# Patient Record
Sex: MALE | Race: WHITE
[De-identification: names, ages, dates, MRNs, and addresses within clinical notes are randomized per-mention and may not be internally consistent; named-entity substitution may affect disease eponyms.]

---

## 2018-06-11 ENCOUNTER — HOSPITAL ENCOUNTER (EMERGENCY)
Dept: HOSPITAL 25 - UCEAST | Age: 58
Discharge: HOME | End: 2018-06-11
Payer: COMMERCIAL

## 2018-06-11 VITALS — DIASTOLIC BLOOD PRESSURE: 75 MMHG | SYSTOLIC BLOOD PRESSURE: 111 MMHG

## 2018-06-11 DIAGNOSIS — H20.9: ICD-10-CM

## 2018-06-11 DIAGNOSIS — K57.92: ICD-10-CM

## 2018-06-11 DIAGNOSIS — Z87.891: ICD-10-CM

## 2018-06-11 DIAGNOSIS — I10: ICD-10-CM

## 2018-06-11 DIAGNOSIS — H93.12: Primary | ICD-10-CM

## 2018-06-11 DIAGNOSIS — H91.92: ICD-10-CM

## 2018-06-11 DIAGNOSIS — E78.5: ICD-10-CM

## 2018-06-11 PROCEDURE — 81003 URINALYSIS AUTO W/O SCOPE: CPT

## 2018-06-11 PROCEDURE — G0463 HOSPITAL OUTPT CLINIC VISIT: HCPCS

## 2018-06-11 PROCEDURE — 99212 OFFICE O/P EST SF 10 MIN: CPT

## 2018-06-11 NOTE — UC
Ear Complaint HPI





- HPI Summary


HPI Summary: 


hearing loss in left ear---began with mild tinnitus on Friday that got worse 

now has hearing loss and increase tinnitus in left ear. 2 weeks ago finishes a 

course of prednisone for posterior uviitis. has had an autoimmune work up and 

no cause was found---after completing prednisone patient reports a flare of 

diverticulitis and one week ago finished a course of cipro and flagyl.    

patient does not work in a noisy environment, .  Patient c/o some 

lightheadedness---but does not describe vertigo---








- History of Current Complaint


Hx Obtained From: Patient


Onset/Duration: Sudden Onset, Lasting Days - 3


Severity Initially: Mild


Severity Currently: Moderate


Associated Signs/Symptoms: Positive: Hearing Loss





<Shey Monroy - Last Filed: 06/11/18 17:45>





<Lori Francois - Last Filed: 06/11/18 17:59>





- History of Current Complaint


Chief Complaint: UCEar


Stated Complaint: EAR COMPLAINT, DIZZY


Time Seen by Provider: 06/11/18 16:09





- Allergies/Home Medications


Allergies/Adverse Reactions: 


 Allergies











Allergy/AdvReac Type Severity Reaction Status Date / Time


 


No Known Allergies Allergy   Verified 06/11/18 15:28











Home Medications: 


 Home Medications





ALPRAZolam [Xanax] 0.25 mg PO BID PRN 06/11/18 [History Confirmed 06/11/18]


Cyclobenzaprine (NF) [Cyclobenzaprine 5 MG (NF)] 5 mg PO DAILY PRN 06/11/18 [

History Confirmed 06/11/18]


Lisinopril/HCTZ 20/12.5(NF) [Zestoretic 20/12.5(NF)] 1 tab PO DAILY 06/11/18 [

History Confirmed 06/11/18]


Ondansetron HCl [Zofran 4 MG TAB] 4 mg PO Q8HR PRN 06/11/18 [History Confirmed 

06/11/18]











PMH/Surg Hx/FS Hx/Imm Hx


Previously Healthy: No - posterior uvitis-unknown cause


Endocrine History: Dyslipidemia


Cardiovascular History: Hypertension


GI/ History: Diverticulitis





- Surgical History


Surgical History: Yes


Surgery Procedure, Year, and Place: hernia repair x2.  back surgery x2 - 

ruptured disc in Lumbar.  rotator cuff repair- 2015.  bilateral cataracts 

surgery- 2017





- Family History


Known Family History: Positive: None





- Social History


Occupation: Employed Full-time


Lives: With Family


Alcohol Use: Weekly


Alcohol Amount: 6 pack beer


Substance Use Type: None


Smoking Status (MU): Former Smoker





<Shey Monroy - Last Filed: 06/11/18 17:45>





Review of Systems


Constitutional: Negative


Skin: Negative


Eyes: Negative, Other - perrla no nystagmus


ENT: Ear Ache - tinnitis and hearing loss left ear


Respiratory: Negative


Cardiovascular: Negative


Gastrointestinal: Nausea


Genitourinary: Negative


Motor: Negative


Neurovascular: Negative


Musculoskeletal: Negative


Neurological: Negative, Other - slight sway with rhomberg test


Psychological: Negative


Is Patient Immunocompromised?: No


All Other Systems Reviewed And Are Negative: Yes





<Shey Monroy - Last Filed: 06/11/18 17:45>





Physical Exam


Triage Information Reviewed: Yes


Appearance: Well-Appearing, No Pain Distress, Thin


Vital Signs: 


 Initial Vital Signs











Temp  99 F   06/11/18 15:33


 


Pulse  89   06/11/18 15:33


 


Resp  20   06/11/18 15:33


 


BP  123/98   06/11/18 15:33


 


Pulse Ox  97   06/11/18 15:33











Vital Signs Reviewed: Yes


Eye Exam: Normal


Eyes: Positive: Conjunctiva Clear, Other: - perrla, eomi, no nystagmus


ENT Exam: Normal


ENT: Positive: Normal ENT inspection, Hearing grossly normal - in right ear, 

Pharynx normal, Nasal congestion, TMs normal, Uvula midline, Other - tounge 

midline, chescks puff equally, equal eyebrow raise.  Negative: Tonsillar 

swelling, Tonsillar exudate, Trismus, Muffled voice, Hoarse voice, Dental 

tenderness, Sinus tenderness


Dental Exam: Normal


Neck exam: Normal


Neck: Positive: Supple, Nontender, No Lymphadenopathy


Respiratory Exam: Normal


Respiratory: Positive: Chest non-tender, Lungs clear, Normal breath sounds, No 

respiratory distress, No accessory muscle use


Cardiovascular Exam: Normal


Cardiovascular: Positive: RRR, No Murmur, Pulses Normal, Brisk Capillary Refill


Abdominal Exam: Normal


Abdomen Description: Positive: Nontender, No Organomegaly, Soft.  Negative: CVA 

Tenderness (R), CVA Tenderness (L)


Bowel Sounds: Positive: Present


Musculoskeletal Exam: Normal


Musculoskeletal: Positive: Strength Intact, ROM Intact, No Edema


Neurological Exam: Normal


Neurological: Positive: Alert, Muscle Tone Normal


Psychological Exam: Normal


Skin Exam: Normal





<Shey Monroy - Last Filed: 06/11/18 17:45>


Vital Signs: 


 Initial Vital Signs











Temp  99 F   06/11/18 15:33


 


Pulse  89   06/11/18 15:33


 


Resp  20   06/11/18 15:33


 


BP  123/98   06/11/18 15:33


 


Pulse Ox  97   06/11/18 15:33














<Lori Francois - Last Filed: 06/11/18 17:59>





Ear Complaint Course/Dx





- Course


Course Of Treatment: reviewed case with Dr. Cryer---Plan to start prednisone 

and call office in the morning for an appointment





- Differential Dx/Diagnosis


Provider Diagnoses: tinnitis,  left ear hearing loss





- Physician Notifications


Discussed Patient Care With: Jonathan Cryer


Time Discussed With Above Provider: 17:40





<Shey Monroy - Last Filed: 06/11/18 17:45>





Discharge





- Sign-Out/Discharge


Documenting (check all that apply): Discharge/Admit/Transfer





- Billing Disposition and Condition


Condition: STABLE


Disposition: Home





<Shey Monroy - Last Filed: 06/11/18 17:45>





- Billing Disposition and Condition


Condition: STABLE


Disposition: Home





<Lori Francois - Last Filed: 06/11/18 17:59>





- Discharge Plan


Condition: Stable


Disposition: HOME


Prescriptions: 


predniSONE TAB* [Deltasone 10 MG TAB*] 10 mg PO DAILY #65 tab


Patient Education Materials:  Hearing Loss (ED), Tinnitus (ED)


Referrals: 


Cryer,Jonathan, MD [Medical Doctor] -  (call in the morning on Tuesday for an 

appointment)


Kimberley Bunrett PA [Primary Care Provider] - 





Attestation Statement


User Type: Provider - I was available for consult. This patient was seen by the 

[facility]. The patient was not presented to, seen by, or examined by me. -Missy





<Lori Francois - Last Filed: 06/11/18 17:59>